# Patient Record
Sex: MALE | Race: WHITE | URBAN - METROPOLITAN AREA
[De-identification: names, ages, dates, MRNs, and addresses within clinical notes are randomized per-mention and may not be internally consistent; named-entity substitution may affect disease eponyms.]

---

## 2022-08-23 ENCOUNTER — OFFICE VISIT (OUTPATIENT)
Dept: URBAN - METROPOLITAN AREA CLINIC 92 | Facility: CLINIC | Age: 32
End: 2022-08-23
Payer: COMMERCIAL

## 2022-08-23 VITALS
WEIGHT: 226 LBS | DIASTOLIC BLOOD PRESSURE: 89 MMHG | SYSTOLIC BLOOD PRESSURE: 145 MMHG | HEART RATE: 95 BPM | BODY MASS INDEX: 31.64 KG/M2 | HEIGHT: 71 IN | TEMPERATURE: 96.4 F

## 2022-08-23 DIAGNOSIS — K21.9 GASTROESOPHAGEAL REFLUX DISEASE WITHOUT ESOPHAGITIS: ICD-10-CM

## 2022-08-23 DIAGNOSIS — R11.0 NAUSEA: ICD-10-CM

## 2022-08-23 DIAGNOSIS — R10.12 LEFT UPPER QUADRANT PAIN: ICD-10-CM

## 2022-08-23 PROCEDURE — 99204 OFFICE O/P NEW MOD 45 MIN: CPT

## 2022-08-23 PROCEDURE — 99204 OFFICE O/P NEW MOD 45 MIN: CPT | Performed by: INTERNAL MEDICINE

## 2022-08-23 RX ORDER — OMEPRAZOLE 40 MG/1
1 CAPSULE 30 MINUTES BEFORE MORNING MEAL CAPSULE, DELAYED RELEASE ORAL ONCE A DAY
Qty: 90 | Refills: 1 | OUTPATIENT
Start: 2022-08-23

## 2022-08-23 NOTE — PHYSICAL EXAM GASTROINTESTINAL
Abdomen,  epigastrium tenderness, left upper quadrant tenderness   soft, nondistended,  no guarding or rigidity,  no masses palpable,  normal bowel sounds Liver and Spleen,no hepatosplenomegaly ,

## 2022-08-23 NOTE — HPI-TODAY'S VISIT:
31  year old male referred to us by Dr. Chaz Farias due to LUQ pain with radiation to the groin. A copy of this note will be sent to the referring provider. Pain started 4 weeks ago  LUQ Abdominal pain started in June. Pain would radiate to the left testicle, got worse, so he went to PCP who gave pt muscle relaxers but still had same issues. Was then referred to Urology who did CT scan to r/o kidney stones which was negative. He was having no issues at f/u with Uro but then started having some more pain the next week. He went back to his PCP w/ some nausea who prescribed him Zofran and referred patient to us. Pain comes and goes over a course of days. Pain is 2/10 and is constant. Sleeps on stomach which makes pain worse. Has not had radiation to the groin recently. Has been taking 2-4 tablets of Tylenol 500mg tablets which helps.  Notes increased GERD sx since May. Pepcid at night relieves the reflux. Has made some changes in diet to help reflux. Denies regurgitation, dysphagia, odynophagia, travel outside country.  Started taking NSAIDs 6-8 per week when the pain started. Before the pain would take NSAIDs prn monthly for headaches. Has 1 BM daily. Denies diarrhea, constipation, unexpected weight, melena, blood in stool. Having BM does not help the pain.

## 2022-10-26 ENCOUNTER — WEB ENCOUNTER (OUTPATIENT)
Dept: URBAN - METROPOLITAN AREA SURGERY CENTER 16 | Facility: SURGERY CENTER | Age: 32
End: 2022-10-26

## 2022-10-28 ENCOUNTER — OFFICE VISIT (OUTPATIENT)
Dept: URBAN - METROPOLITAN AREA SURGERY CENTER 16 | Facility: SURGERY CENTER | Age: 32
End: 2022-10-28
Payer: COMMERCIAL

## 2022-10-28 DIAGNOSIS — K29.60 ADENOPAPILLOMATOSIS GASTRICA: ICD-10-CM

## 2022-10-28 DIAGNOSIS — R10.12 ABDOMINAL BURNING SENSATION IN LEFT UPPER QUADRANT: ICD-10-CM

## 2022-10-28 PROCEDURE — G8907 PT DOC NO EVENTS ON DISCHARG: HCPCS | Performed by: INTERNAL MEDICINE

## 2022-10-28 PROCEDURE — 43239 EGD BIOPSY SINGLE/MULTIPLE: CPT | Performed by: INTERNAL MEDICINE

## 2022-11-30 PROBLEM — 266435005: Status: ACTIVE | Noted: 2022-08-23

## 2022-12-02 ENCOUNTER — DASHBOARD ENCOUNTERS (OUTPATIENT)
Age: 32
End: 2022-12-02

## 2022-12-02 ENCOUNTER — OFFICE VISIT (OUTPATIENT)
Dept: URBAN - METROPOLITAN AREA CLINIC 92 | Facility: CLINIC | Age: 32
End: 2022-12-02
Payer: COMMERCIAL

## 2022-12-02 ENCOUNTER — WEB ENCOUNTER (OUTPATIENT)
Dept: URBAN - METROPOLITAN AREA CLINIC 92 | Facility: CLINIC | Age: 32
End: 2022-12-02

## 2022-12-02 VITALS
DIASTOLIC BLOOD PRESSURE: 80 MMHG | HEIGHT: 71 IN | SYSTOLIC BLOOD PRESSURE: 137 MMHG | WEIGHT: 230 LBS | TEMPERATURE: 96.8 F | HEART RATE: 91 BPM | BODY MASS INDEX: 32.2 KG/M2

## 2022-12-02 DIAGNOSIS — R10.12 LEFT UPPER QUADRANT PAIN: ICD-10-CM

## 2022-12-02 DIAGNOSIS — K21.9 GASTROESOPHAGEAL REFLUX DISEASE WITHOUT ESOPHAGITIS: ICD-10-CM

## 2022-12-02 DIAGNOSIS — R11.0 NAUSEA: ICD-10-CM

## 2022-12-02 PROCEDURE — 99213 OFFICE O/P EST LOW 20 MIN: CPT

## 2022-12-02 RX ORDER — OMEPRAZOLE 40 MG/1
1 CAPSULE 30 MINUTES BEFORE MORNING MEAL CAPSULE, DELAYED RELEASE ORAL ONCE A DAY
Qty: 90 | Refills: 1 | Status: DISCONTINUED | COMMUNITY
Start: 2022-08-23

## 2022-12-02 RX ORDER — OMEPRAZOLE 20 MG/1
1 CAPSULE 30 MINUTES BEFORE MORNING MEAL CAPSULE, DELAYED RELEASE ORAL ONCE A DAY
Qty: 90 | Refills: 0 | OUTPATIENT
Start: 2022-12-02

## 2022-12-02 NOTE — HPI-TODAY'S VISIT:
31  year old male presents today derek f/u. He was seen on 8/23/22 due to LUQ pain.  Hes had a workup with  Urology who did CT scan to r/o kidney stones which was negative. He was having no issues at f/u with Uro but then started having some more pain the next week. He went back to his PCP w/ some nausea who prescribed him Zofran and referred patient to us. Pain comes and goes over a course of days. Pain is 2/10 and is constant. Sleeps on stomach which makes pain worse. Has radiation to the groin sometimes. Has been taking 2-4 tablets of Tylenol 500mg tablets which helps.  Notes increased GERD sx since May. He was given omeprazole 40mg QAM at last visit which he took for several weeks with relief of reflux but not the pain sx. He was told by his PCP to increase to BID and when he did he started to notice SE like constipation and sore throat so he was switched to nexium. He did nexium BID with no relief of LUQ pain.  Denies regurgitation, dysphagia, odynophagia, travel outside country.  EGD done on 10/28/22: mild chronic gastritis with no h pylori. He recently saw his PCP who obtained a US on him and he has a f/u with him in a few weeks.  Started taking NSAIDs 6-8 per week when the pain started. Before the pain would take NSAIDs prn monthly for headaches. Has 1 BM daily. Denies diarrhea, constipation, unexpected weight, melena, blood in stool. Having BM does not help the pain.

## 2022-12-02 NOTE — PHYSICAL EXAM GASTROINTESTINAL
Abdomen,  soft, nondistended,  no guarding or rigidity,  no masses palpable,  normal bowel sounds Liver and Spleen,no hepatosplenomegaly ,

## 2023-06-02 ENCOUNTER — OFFICE VISIT (OUTPATIENT)
Dept: URBAN - METROPOLITAN AREA CLINIC 92 | Facility: CLINIC | Age: 33
End: 2023-06-02